# Patient Record
Sex: MALE | Race: BLACK OR AFRICAN AMERICAN | NOT HISPANIC OR LATINO | Employment: UNEMPLOYED | URBAN - METROPOLITAN AREA
[De-identification: names, ages, dates, MRNs, and addresses within clinical notes are randomized per-mention and may not be internally consistent; named-entity substitution may affect disease eponyms.]

---

## 2024-01-01 ENCOUNTER — HOSPITAL ENCOUNTER (EMERGENCY)
Facility: HOSPITAL | Age: 24
Discharge: HOME | End: 2024-01-01
Attending: EMERGENCY MEDICINE
Payer: MEDICAID

## 2024-01-01 VITALS
OXYGEN SATURATION: 99 % | HEART RATE: 62 BPM | WEIGHT: 130 LBS | DIASTOLIC BLOOD PRESSURE: 59 MMHG | RESPIRATION RATE: 20 BRPM | SYSTOLIC BLOOD PRESSURE: 133 MMHG | HEIGHT: 67 IN | TEMPERATURE: 98 F | BODY MASS INDEX: 20.4 KG/M2

## 2024-01-01 DIAGNOSIS — K02.9 DENTAL CARIES: Primary | ICD-10-CM

## 2024-01-01 DIAGNOSIS — K08.89 PAIN, DENTAL: ICD-10-CM

## 2024-01-01 PROCEDURE — 99284 EMERGENCY DEPT VISIT MOD MDM: CPT

## 2024-01-01 PROCEDURE — 25000003 PHARM REV CODE 250: Performed by: EMERGENCY MEDICINE

## 2024-01-01 RX ORDER — HYDROCODONE BITARTRATE AND ACETAMINOPHEN 5; 325 MG/1; MG/1
1 TABLET ORAL EVERY 6 HOURS PRN
Qty: 12 TABLET | Refills: 0 | Status: SHIPPED | OUTPATIENT
Start: 2024-01-01 | End: 2024-01-04

## 2024-01-01 RX ORDER — HYDROCODONE BITARTRATE AND ACETAMINOPHEN 5; 325 MG/1; MG/1
1 TABLET ORAL
Status: COMPLETED | OUTPATIENT
Start: 2024-01-01 | End: 2024-01-01

## 2024-01-01 RX ORDER — NAPROXEN 500 MG/1
500 TABLET ORAL 2 TIMES DAILY PRN
Qty: 10 TABLET | Refills: 0 | Status: SHIPPED | OUTPATIENT
Start: 2024-01-01 | End: 2024-01-06

## 2024-01-01 RX ORDER — CHLORHEXIDINE GLUCONATE ORAL RINSE 1.2 MG/ML
15 SOLUTION DENTAL 2 TIMES DAILY
Qty: 420 ML | Refills: 0 | Status: SHIPPED | OUTPATIENT
Start: 2024-01-01 | End: 2024-01-15

## 2024-01-01 RX ADMIN — HYDROCODONE BITARTRATE AND ACETAMINOPHEN 1 TABLET: 5; 325 TABLET ORAL at 03:01

## 2024-01-01 NOTE — ED PROVIDER NOTES
"Encounter Date: 1/1/2024    SCRIBE #1 NOTE: I, Ronal Aguilar, am scribing for, and in the presence of,  Berna Velez MD. I have scribed the following portions of the note - Other sections scribed: HPI, ROS, PE.       History     Chief Complaint   Patient presents with    Dental Pain     Pt c/o L lower dental pain worsening since yesterday. Reports needs tooth filling on that side, put in a "temp filling" from [employer] kit that he kept in longer than recommended 48 hours. Denies fevers.      22 y/o male presents to the ED for evaluation of left lower dental pain. Pt reports that his tooth filling recently fell out, so he put in an over-the-counter temporary filling. The filling has been in for 4 days, patient continues to have pain in that area. He has been taking over-the-counter Ibuprofen with no relief. Denies fevers and facial swelling. Does not have a dentist but is currently trying to schedule an appointment with one.    The history is provided by the patient. No  was used.     Review of patient's allergies indicates:  No Known Allergies  No past medical history on file.  No past surgical history on file.  No family history on file.     Review of Systems   Constitutional:  Negative for fever.   HENT:  Positive for dental problem. Negative for facial swelling.    Gastrointestinal:  Negative for nausea and vomiting.   Musculoskeletal:  Negative for neck stiffness.   Skin:  Negative for rash.   Neurological:  Negative for weakness, numbness and headaches.       Physical Exam     Initial Vitals [01/01/24 0028]   BP Pulse Resp Temp SpO2   (!) 133/59 (!) 58 18 98 °F (36.7 °C) 99 %      MAP       --         Physical Exam    Nursing note and vitals reviewed.  Constitutional: He appears well-developed and well-nourished. He is not diaphoretic. No distress.   HENT:   Head: Normocephalic and atraumatic.   Mouth/Throat: Oropharynx is clear and moist.   Paste on the left mandibular molar (18)  No " surrounding gingival erythema or edema  No trismus  No facial swelling   Eyes: Conjunctivae and EOM are normal.   Neck: Neck supple.   Normal range of motion.  Pulmonary/Chest: No respiratory distress.   Abdominal: He exhibits no distension.   Musculoskeletal:         General: Normal range of motion.      Cervical back: Normal range of motion and neck supple.     Neurological: He is alert and oriented to person, place, and time. He has normal strength. GCS eye subscore is 4. GCS verbal subscore is 5. GCS motor subscore is 6.   Skin: Skin is warm and dry. No pallor.   Psychiatric: He has a normal mood and affect.         ED Course   Procedures  Labs Reviewed - No data to display       Imaging Results    None          Medications   HYDROcodone-acetaminophen 5-325 mg per tablet 1 tablet (1 tablet Oral Given 1/1/24 5479)     Medical Decision Making  22 yo M with dental pain after filling fell out. He has cover the defect with an OTC filler. I did offer to take it out but he tells me the directions explicitly state a dentist is to remove the filling thus he is attempting to follow-up with dentistry. He is simply asking for pain medicine. No evidence of infection at this time. Return precautions discussed and he will be discharged.     The differential diagnosis includes, but is not limited to dental caries, gingivitis, dental abscess and others    Risk  Prescription drug management.            Scribe Attestation:   Scribe #1: I performed the above scribed service and the documentation accurately describes the services I performed. I attest to the accuracy of the note.    Attending Attestation:           Physician Attestation for Scribe:  Physician Attestation Statement for Scribe #1: I, Berna Velez MD, reviewed documentation, as scribed by Ronal Aguilar in my presence, and it is both accurate and complete.                                    Clinical Impression:  Final diagnoses:  [K02.9] Dental caries  (Primary)  [K08.89] Pain, dental          ED Disposition Condition    Discharge Stable          ED Prescriptions       Medication Sig Dispense Start Date End Date Auth. Provider    naproxen (NAPROSYN) 500 MG tablet Take 1 tablet (500 mg total) by mouth 2 (two) times daily as needed. 10 tablet 1/1/2024 1/6/2024 Berna Velez MD    HYDROcodone-acetaminophen (NORCO) 5-325 mg per tablet Take 1 tablet by mouth every 6 (six) hours as needed for Pain. 12 tablet 1/1/2024 1/4/2024 Berna Velez MD    chlorhexidine (PERIDEX) 0.12 % solution Use as directed 15 mLs in the mouth or throat 2 (two) times daily. for 14 days 420 mL 1/1/2024 1/15/2024 Berna Velze MD          Follow-up Information       Follow up With Specialties Details Why Contact Info    [facility] - Emergency Dept Emergency Medicine  As needed, If symptoms worsen [facility] [facility] [id number] [phone]        See a dentist this week for evaluation             Berna Velez MD  01/02/24 0005